# Patient Record
Sex: FEMALE | Race: WHITE | NOT HISPANIC OR LATINO | URBAN - METROPOLITAN AREA
[De-identification: names, ages, dates, MRNs, and addresses within clinical notes are randomized per-mention and may not be internally consistent; named-entity substitution may affect disease eponyms.]

---

## 2020-07-16 ENCOUNTER — TELEPHONE ENCOUNTER (OUTPATIENT)
Dept: URBAN - METROPOLITAN AREA CLINIC 92 | Facility: CLINIC | Age: 50
End: 2020-07-16

## 2020-07-16 RX ORDER — PHENYLEPHRINE HCL 0.25 %
AS DIRECTED SUPPOSITORY, RECTAL RECTAL
Qty: 30 SUPPOSITORIES | Refills: 2 | OUTPATIENT
Start: 2020-07-16 | End: 2020-10-14

## 2020-07-21 ENCOUNTER — TELEPHONE ENCOUNTER (OUTPATIENT)
Dept: URBAN - METROPOLITAN AREA CLINIC 115 | Facility: CLINIC | Age: 50
End: 2020-07-21

## 2020-07-21 RX ORDER — HYDROCORTISONE ACETATE 30 MG/1
1 SUPPOSITORY SUPPOSITORY RECTAL
Qty: 60 SUPPOSITORIES | Refills: 1 | OUTPATIENT

## 2020-07-30 ENCOUNTER — OFFICE VISIT (OUTPATIENT)
Dept: URBAN - METROPOLITAN AREA TELEHEALTH 2 | Facility: TELEHEALTH | Age: 50
End: 2020-07-30

## 2020-08-05 ENCOUNTER — OFFICE VISIT (OUTPATIENT)
Dept: URBAN - METROPOLITAN AREA TELEHEALTH 2 | Facility: TELEHEALTH | Age: 50
End: 2020-08-05
Payer: COMMERCIAL

## 2020-08-05 ENCOUNTER — OFFICE VISIT (OUTPATIENT)
Dept: URBAN - METROPOLITAN AREA TELEHEALTH 2 | Facility: TELEHEALTH | Age: 50
End: 2020-08-05

## 2020-08-05 ENCOUNTER — DASHBOARD ENCOUNTERS (OUTPATIENT)
Age: 50
End: 2020-08-05

## 2020-08-05 DIAGNOSIS — E55.9 VITAMIN D DEFICIENCY DISEASE: ICD-10-CM

## 2020-08-05 DIAGNOSIS — K62.5 RECTAL BLEEDING: ICD-10-CM

## 2020-08-05 DIAGNOSIS — K60.0 ACUTE ANAL FISSURE: ICD-10-CM

## 2020-08-05 DIAGNOSIS — K64.1 GRADE II HEMORRHOIDS: ICD-10-CM

## 2020-08-05 DIAGNOSIS — R19.4 CHANGE IN BOWEL HABITS: ICD-10-CM

## 2020-08-05 PROBLEM — 12063002: Status: ACTIVE | Noted: 2020-08-05

## 2020-08-05 PROBLEM — 721704005: Status: ACTIVE | Noted: 2020-08-05

## 2020-08-05 PROBLEM — 34713006: Status: ACTIVE | Noted: 2020-08-05

## 2020-08-05 PROBLEM — 197151007: Status: ACTIVE | Noted: 2020-08-05

## 2020-08-05 PROBLEM — 129851009: Status: ACTIVE | Noted: 2020-08-05

## 2020-08-05 PROCEDURE — 99214 OFFICE O/P EST MOD 30 MIN: CPT | Performed by: INTERNAL MEDICINE

## 2020-08-05 RX ORDER — PHENYLEPHRINE HCL 0.25 %
AS DIRECTED SUPPOSITORY, RECTAL RECTAL
Qty: 30 SUPPOSITORIES | Refills: 2 | Status: ACTIVE | COMMUNITY
Start: 2020-07-16 | End: 2020-10-14

## 2020-08-05 RX ORDER — HYDROCORTISONE ACETATE 30 MG/1
1 SUPPOSITORY SUPPOSITORY RECTAL
Qty: 60 SUPPOSITORIES | Refills: 1 | Status: ACTIVE | COMMUNITY

## 2020-08-05 NOTE — PHYSICAL EXAM GASTROINTESTINAL
Abdomen , soft, nontender, nondistended , no guarding or rigidity , no masses palpable , normal bowel sounds , Liver and Spleen , no hepatomegaly present , no hepatosplenomegaly , liver nontender , spleen not palpable Rectal exam positive for swollen hemorrhoids with anal fissure noted.

## 2020-08-05 NOTE — HPI-OTHER HISTORIES
The pt presents for evaluation of hemorrhoidal bleeding and anal fissures who presents for evaluation of colonoscopy. The pt has been seen by Dr. Leal in the past and was scheduled for a colon. The pt notes that she has struggled with incomplete evacuation of stools. She has been by colorectal surgery as well and was given Proctozone cream HC 2.5% BID and Nitroglycerin ointment as well.

## 2020-10-21 ENCOUNTER — OFFICE VISIT (OUTPATIENT)
Dept: URBAN - METROPOLITAN AREA SURGERY CENTER 16 | Facility: SURGERY CENTER | Age: 50
End: 2020-10-21